# Patient Record
Sex: FEMALE | Race: BLACK OR AFRICAN AMERICAN | NOT HISPANIC OR LATINO | Employment: FULL TIME | URBAN - METROPOLITAN AREA
[De-identification: names, ages, dates, MRNs, and addresses within clinical notes are randomized per-mention and may not be internally consistent; named-entity substitution may affect disease eponyms.]

---

## 2023-03-02 LAB
CLUE CELLS: NORMAL
NUGENT SCORE: 1
YEAST: NORMAL

## 2023-03-15 LAB
ERYTHROCYTE DISTRIBUTION WIDTH (RATIO) BY AUTOMATED COUNT: 12.6 % (ref 11.5–14.5)
ERYTHROCYTE MEAN CORPUSCULAR HEMOGLOBIN CONCENTRATION (G/DL) BY AUTOMATED: 32.8 G/DL (ref 32–36)
ERYTHROCYTE MEAN CORPUSCULAR VOLUME (FL) BY AUTOMATED COUNT: 101 FL (ref 80–100)
ERYTHROCYTES (10*6/UL) IN BLOOD BY AUTOMATED COUNT: 3.01 X10E12/L (ref 4–5.2)
FOLATE, SERUM, PREGNANCY: 5.9 NG/ML
HEMATOCRIT (%) IN BLOOD BY AUTOMATED COUNT: 30.5 % (ref 36–46)
HEMOGLOBIN (G/DL) IN BLOOD: 10 G/DL (ref 12–16)
LEUKOCYTES (10*3/UL) IN BLOOD BY AUTOMATED COUNT: 8.4 X10E9/L (ref 4.4–11.3)
NRBC (PER 100 WBCS) BY AUTOMATED COUNT: 0 /100 WBC (ref 0–0)
PLATELETS (10*3/UL) IN BLOOD AUTOMATED COUNT: 319 X10E9/L (ref 150–450)
REFLEX ADDED, ANEMIA PANEL: ABNORMAL
VITAMIN B12, PREGNANCY: 355 PG/ML

## 2023-03-17 LAB — FERRITIN (UG/LL) IN SER/PLAS: 11 UG/L (ref 8–150)

## 2023-04-07 ENCOUNTER — PATIENT OUTREACH (OUTPATIENT)
Dept: CARE COORDINATION | Facility: CLINIC | Age: 43
End: 2023-04-07
Payer: COMMERCIAL

## 2023-04-07 SDOH — ECONOMIC STABILITY: FOOD INSECURITY
ARE ANY OF YOUR NEEDS URGENT? FOR EXAMPLE, UNCERTAINTY OF WHERE YOU WILL GET YOUR NEXT MEAL OR NOT HAVING THE MEDICATIONS YOU NEED TO TAKE TOMORROW.: NO

## 2023-04-07 SDOH — ECONOMIC STABILITY: GENERAL: WOULD YOU LIKE HELP WITH ANY OF THE FOLLOWING NEEDS?: I DONT NEED HELP WITH ANY OF THESE

## 2023-04-14 ENCOUNTER — DOCUMENTATION (OUTPATIENT)
Dept: CARE COORDINATION | Facility: CLINIC | Age: 43
End: 2023-04-14
Payer: COMMERCIAL

## 2023-04-21 ENCOUNTER — DOCUMENTATION (OUTPATIENT)
Dept: CARE COORDINATION | Facility: CLINIC | Age: 43
End: 2023-04-21
Payer: COMMERCIAL

## 2023-08-25 PROBLEM — O14.10 PREECLAMPSIA, SEVERE (HHS-HCC): Status: ACTIVE | Noted: 2023-08-25

## 2023-08-25 PROBLEM — O26.20: Status: ACTIVE | Noted: 2023-08-25

## 2023-08-25 PROBLEM — O16.9 ELEVATED BLOOD PRESSURE AFFECTING PREGNANCY, ANTEPARTUM (HHS-HCC): Status: ACTIVE | Noted: 2023-08-25

## 2023-08-25 PROBLEM — O09.299: Status: ACTIVE | Noted: 2023-08-25

## 2023-08-25 PROBLEM — M54.9 BACK PAIN: Status: ACTIVE | Noted: 2023-08-25

## 2023-08-25 PROBLEM — L68.0 HIRSUTISM: Status: ACTIVE | Noted: 2023-08-25

## 2023-08-25 PROBLEM — N92.0 MENORRHAGIA: Status: ACTIVE | Noted: 2023-08-25

## 2023-08-25 PROBLEM — O09.529 ADVANCED MATERNAL AGE IN MULTIGRAVIDA (HHS-HCC): Status: ACTIVE | Noted: 2023-08-25

## 2023-08-25 PROBLEM — R60.9 EDEMA: Status: ACTIVE | Noted: 2023-08-25

## 2023-08-25 PROBLEM — D75.89 MACROCYTOSIS: Status: ACTIVE | Noted: 2023-08-25

## 2023-08-25 PROBLEM — O34.219 UTERINE SCAR FROM PREVIOUS CESAREAN DELIVERY AFFECTING PREGNANCY (HHS-HCC): Status: ACTIVE | Noted: 2023-08-25

## 2023-08-25 PROBLEM — O99.013 ANEMIA AFFECTING PREGNANCY IN THIRD TRIMESTER (HHS-HCC): Status: ACTIVE | Noted: 2023-08-25

## 2023-08-25 PROBLEM — O34.219 HISTORY OF CESAREAN DELIVERY, ANTEPARTUM (HHS-HCC): Status: ACTIVE | Noted: 2023-08-25

## 2023-08-25 PROBLEM — R07.9 CHEST PAIN: Status: ACTIVE | Noted: 2023-08-25

## 2023-08-25 PROBLEM — R06.2 WHEEZING: Status: ACTIVE | Noted: 2023-08-25

## 2023-08-25 PROBLEM — O13.9 GESTATIONAL HYPERTENSION (HHS-HCC): Status: ACTIVE | Noted: 2023-08-25

## 2023-08-25 PROBLEM — O36.80X0 PREGNANCY WITH UNCERTAIN FETAL VIABILITY (HHS-HCC): Status: ACTIVE | Noted: 2023-08-25

## 2023-08-25 PROBLEM — L73.2 HYDRADENITIS: Status: ACTIVE | Noted: 2023-08-25

## 2023-08-25 PROBLEM — O45.90 PLACENTAL ABRUPTION (HHS-HCC): Status: ACTIVE | Noted: 2023-08-25

## 2023-08-25 PROBLEM — L02.92 RECURRENT BOILS: Status: ACTIVE | Noted: 2023-08-25

## 2023-08-25 PROBLEM — O03.9 MISCARRIAGE (HHS-HCC): Status: ACTIVE | Noted: 2023-08-25

## 2023-08-25 PROBLEM — E03.9 HYPOTHYROIDISM: Status: ACTIVE | Noted: 2023-08-25

## 2023-08-25 PROBLEM — R74.8 HIGH LEVEL OF CARDIAC MARKER: Status: ACTIVE | Noted: 2023-08-25

## 2023-08-25 PROBLEM — R53.83 FATIGUE: Status: ACTIVE | Noted: 2023-08-25

## 2023-08-25 PROBLEM — O26.00 EXCESS WEIGHT GAIN IN PREGNANCY (HHS-HCC): Status: ACTIVE | Noted: 2023-08-25

## 2023-08-25 PROBLEM — N94.6 DYSMENORRHEA: Status: ACTIVE | Noted: 2023-08-25

## 2023-08-25 PROBLEM — R30.0 DYSURIA: Status: ACTIVE | Noted: 2023-08-25

## 2023-08-25 PROBLEM — R79.89 ELEVATED BRAIN NATRIURETIC PEPTIDE (BNP) LEVEL: Status: ACTIVE | Noted: 2023-08-25

## 2023-08-25 PROBLEM — O99.210 OBESITY IN PREGNANCY (HHS-HCC): Status: ACTIVE | Noted: 2023-08-25

## 2023-08-25 PROBLEM — O28.8 NON-REACTIVE NST (NON-STRESS TEST): Status: ACTIVE | Noted: 2023-08-25

## 2023-08-25 PROBLEM — O35.BXX0 FETAL VENTRICULAR SEPTAL DEFECT AFFECTING ANTEPARTUM CARE OF MOTHER (HHS-HCC): Status: ACTIVE | Noted: 2023-08-25

## 2023-08-25 PROBLEM — R35.0 URINE FREQUENCY: Status: ACTIVE | Noted: 2023-08-25

## 2023-08-25 PROBLEM — J45.909: Status: ACTIVE | Noted: 2023-04-19

## 2023-08-25 PROBLEM — O35.BXX0 ABNORMAL FETAL ECHOCARDIOGRAM AFFECTING ANTEPARTUM CARE OF MOTHER (HHS-HCC): Status: ACTIVE | Noted: 2023-08-25

## 2023-08-25 PROBLEM — L65.9 HAIR LOSS: Status: ACTIVE | Noted: 2023-08-25

## 2023-08-25 PROBLEM — R73.09 ABNORMAL GLUCOSE: Status: ACTIVE | Noted: 2023-08-25

## 2023-08-25 RX ORDER — ALBUTEROL SULFATE 0.83 MG/ML
SOLUTION RESPIRATORY (INHALATION)
COMMUNITY
Start: 2023-02-02

## 2023-08-25 RX ORDER — FERROUS SULFATE 325(65) MG
1 TABLET ORAL 2 TIMES DAILY
COMMUNITY
Start: 2023-02-09 | End: 2023-10-04 | Stop reason: ALTCHOICE

## 2023-08-25 RX ORDER — HYDROCHLOROTHIAZIDE 12.5 MG/1
12.5 TABLET ORAL DAILY
COMMUNITY
Start: 2023-04-13 | End: 2023-10-04 | Stop reason: ALTCHOICE

## 2023-08-25 RX ORDER — AMLODIPINE BESYLATE 10 MG/1
10 TABLET ORAL DAILY
COMMUNITY
Start: 2023-04-13 | End: 2023-10-04 | Stop reason: SDUPTHER

## 2023-08-25 RX ORDER — DEXAMETHASONE 4 MG/1
1 TABLET ORAL 2 TIMES DAILY
COMMUNITY
Start: 2023-01-27 | End: 2023-10-04 | Stop reason: ALTCHOICE

## 2023-08-25 RX ORDER — DROSPIRENONE 4 MG/1
1 TABLET, FILM COATED ORAL DAILY
COMMUNITY
Start: 2022-07-05 | End: 2023-10-04 | Stop reason: ALTCHOICE

## 2023-08-25 RX ORDER — LEVOTHYROXINE SODIUM 50 UG/1
50 CAPSULE ORAL DAILY
COMMUNITY
End: 2023-10-04 | Stop reason: ENTERED-IN-ERROR

## 2023-08-25 RX ORDER — NORETHINDRONE 0.35 MG/1
1 TABLET ORAL DAILY
COMMUNITY
Start: 2019-06-11 | End: 2023-10-04 | Stop reason: ALTCHOICE

## 2023-08-25 RX ORDER — BUDESONIDE AND FORMOTEROL FUMARATE DIHYDRATE 80; 4.5 UG/1; UG/1
2 AEROSOL RESPIRATORY (INHALATION) 2 TIMES DAILY
COMMUNITY
Start: 2023-03-22 | End: 2023-10-04 | Stop reason: ALTCHOICE

## 2023-08-25 RX ORDER — IBUPROFEN 400 MG/1
400 TABLET ORAL EVERY 6 HOURS
COMMUNITY
Start: 2019-06-11 | End: 2023-10-04 | Stop reason: SDUPTHER

## 2023-08-25 RX ORDER — LEVOTHYROXINE SODIUM 175 UG/1
1 TABLET ORAL DAILY
COMMUNITY
End: 2023-10-04 | Stop reason: ENTERED-IN-ERROR

## 2023-08-25 RX ORDER — IBUPROFEN 600 MG/1
600 TABLET ORAL EVERY 6 HOURS
COMMUNITY
Start: 2023-04-06 | End: 2023-10-04 | Stop reason: ALTCHOICE

## 2023-08-25 RX ORDER — POLYETHYLENE GLYCOL 3350 17 G/17G
17 POWDER, FOR SOLUTION ORAL 2 TIMES DAILY PRN
COMMUNITY
Start: 2023-04-06 | End: 2023-10-04 | Stop reason: ALTCHOICE

## 2023-08-25 RX ORDER — METOCLOPRAMIDE 5 MG/1
5 TABLET ORAL 2 TIMES DAILY
COMMUNITY
Start: 2023-04-13 | End: 2023-10-04 | Stop reason: ALTCHOICE

## 2023-08-25 RX ORDER — BREAST PUMP
EACH MISCELLANEOUS
COMMUNITY

## 2023-08-25 RX ORDER — AMOXICILLIN AND CLAVULANATE POTASSIUM 875; 125 MG/1; MG/1
1 TABLET, FILM COATED ORAL
COMMUNITY
Start: 2023-02-02

## 2023-08-25 RX ORDER — ACETAMINOPHEN 325 MG/1
3 TABLET ORAL EVERY 6 HOURS
COMMUNITY
Start: 2023-04-06 | End: 2023-10-04 | Stop reason: ALTCHOICE

## 2023-08-25 RX ORDER — DOCUSATE SODIUM 100 MG/1
100 CAPSULE ORAL AS NEEDED
COMMUNITY
Start: 2019-06-11 | End: 2023-10-04 | Stop reason: ALTCHOICE

## 2023-08-25 RX ORDER — ALBUTEROL SULFATE 90 UG/1
2 AEROSOL, METERED RESPIRATORY (INHALATION) AS NEEDED
COMMUNITY
Start: 2023-01-17

## 2023-08-25 RX ORDER — FUROSEMIDE 20 MG/1
1 TABLET ORAL DAILY
COMMUNITY
Start: 2023-04-10 | End: 2023-10-04 | Stop reason: ALTCHOICE

## 2023-08-27 PROBLEM — R79.89 ELEVATED TROPONIN LEVEL: Status: ACTIVE | Noted: 2023-08-27

## 2023-10-04 ENCOUNTER — LAB (OUTPATIENT)
Dept: LAB | Facility: LAB | Age: 43
End: 2023-10-04
Payer: COMMERCIAL

## 2023-10-04 ENCOUNTER — OFFICE VISIT (OUTPATIENT)
Dept: ENDOCRINOLOGY | Facility: CLINIC | Age: 43
End: 2023-10-04
Payer: COMMERCIAL

## 2023-10-04 VITALS — WEIGHT: 243.4 LBS | HEIGHT: 63 IN | HEART RATE: 76 BPM | BODY MASS INDEX: 43.12 KG/M2

## 2023-10-04 DIAGNOSIS — E03.9 HYPOTHYROIDISM, UNSPECIFIED TYPE: Primary | ICD-10-CM

## 2023-10-04 DIAGNOSIS — E03.9 HYPOTHYROIDISM, UNSPECIFIED TYPE: ICD-10-CM

## 2023-10-04 DIAGNOSIS — Z00.00 ENCOUNTER FOR GENERAL ADULT MEDICAL EXAMINATION WITHOUT ABNORMAL FINDINGS: Primary | ICD-10-CM

## 2023-10-04 DIAGNOSIS — Z00.00 ENCOUNTER FOR GENERAL ADULT MEDICAL EXAMINATION WITHOUT ABNORMAL FINDINGS: ICD-10-CM

## 2023-10-04 LAB
ALBUMIN SERPL BCP-MCNC: 4.2 G/DL (ref 3.4–5)
ALP SERPL-CCNC: 69 U/L (ref 33–110)
ALT SERPL W P-5'-P-CCNC: 12 U/L (ref 7–45)
ANION GAP SERPL CALC-SCNC: 11 MMOL/L (ref 10–20)
AST SERPL W P-5'-P-CCNC: 14 U/L (ref 9–39)
BILIRUB SERPL-MCNC: 0.6 MG/DL (ref 0–1.2)
BUN SERPL-MCNC: 16 MG/DL (ref 6–23)
CALCIUM SERPL-MCNC: 9.3 MG/DL (ref 8.6–10.6)
CHLORIDE SERPL-SCNC: 106 MMOL/L (ref 98–107)
CO2 SERPL-SCNC: 28 MMOL/L (ref 21–32)
CREAT SERPL-MCNC: 0.63 MG/DL (ref 0.5–1.05)
ERYTHROCYTE [DISTWIDTH] IN BLOOD BY AUTOMATED COUNT: 12.8 % (ref 11.5–14.5)
EST. AVERAGE GLUCOSE BLD GHB EST-MCNC: 91 MG/DL
FERRITIN SERPL-MCNC: 26 NG/ML (ref 8–150)
GFR SERPL CREATININE-BSD FRML MDRD: >90 ML/MIN/1.73M*2
GLUCOSE SERPL-MCNC: 80 MG/DL (ref 74–99)
HBA1C MFR BLD: 4.8 %
HCT VFR BLD AUTO: 34.8 % (ref 36–46)
HGB BLD-MCNC: 11 G/DL (ref 12–16)
HGB RETIC QN: 37 PG (ref 28–38)
IMMATURE RETIC FRACTION: 6.9 %
IRON SATN MFR SERPL: 21 % (ref 25–45)
IRON SERPL-MCNC: 73 UG/DL (ref 35–150)
MCH RBC QN AUTO: 32.2 PG (ref 26–34)
MCHC RBC AUTO-ENTMCNC: 31.6 G/DL (ref 32–36)
MCV RBC AUTO: 102 FL (ref 80–100)
NRBC BLD-RTO: 0 /100 WBCS (ref 0–0)
PLATELET # BLD AUTO: 318 X10*3/UL (ref 150–450)
PMV BLD AUTO: 9.7 FL (ref 7.5–11.5)
POTASSIUM SERPL-SCNC: 4 MMOL/L (ref 3.5–5.3)
PROT SERPL-MCNC: 6.7 G/DL (ref 6.4–8.2)
RBC # BLD AUTO: 3.42 X10*6/UL (ref 4–5.2)
RETICS #: 0.05 X10*6/UL (ref 0.02–0.08)
RETICS/RBC NFR AUTO: 1.5 % (ref 0.5–2)
SODIUM SERPL-SCNC: 141 MMOL/L (ref 136–145)
T4 FREE SERPL-MCNC: 1.04 NG/DL (ref 0.78–1.48)
TIBC SERPL-MCNC: 356 UG/DL (ref 240–445)
TSH SERPL-ACNC: 1.27 MIU/L (ref 0.44–3.98)
UIBC SERPL-MCNC: 283 UG/DL (ref 110–370)
WBC # BLD AUTO: 4.5 X10*3/UL (ref 4.4–11.3)

## 2023-10-04 PROCEDURE — 1036F TOBACCO NON-USER: CPT | Performed by: INTERNAL MEDICINE

## 2023-10-04 PROCEDURE — 99213 OFFICE O/P EST LOW 20 MIN: CPT | Performed by: INTERNAL MEDICINE

## 2023-10-04 PROCEDURE — 36415 COLL VENOUS BLD VENIPUNCTURE: CPT

## 2023-10-04 RX ORDER — LEVOTHYROXINE SODIUM 50 UG/1
50 TABLET ORAL DAILY
Qty: 90 TABLET | Refills: 3 | Status: SHIPPED | OUTPATIENT
Start: 2023-10-04 | End: 2024-10-03

## 2023-10-04 ASSESSMENT — ENCOUNTER SYMPTOMS
SHORTNESS OF BREATH: 0
CHILLS: 0
VOMITING: 0
NAUSEA: 0
FEVER: 0
COUGH: 0
DIARRHEA: 0
HEADACHES: 0
FATIGUE: 0
PALPITATIONS: 0

## 2023-10-04 NOTE — PROGRESS NOTES
"Subjective   Patient ID: Marcelina Morin is a 43 y.o. female who presents for Hypothyroidism.  HPI  Since last visit had baby 6 months ago.  Had placental abruption and eclampsia so baby came 5 wks early but now doing great.   Old kids helping. Still breastfeeding but having menses.  Taking t4 as directed.  Notes some hair loss recently    Review of Systems   Constitutional:  Negative for chills, fatigue and fever.   Respiratory:  Negative for cough and shortness of breath.    Cardiovascular:  Negative for chest pain and palpitations.   Gastrointestinal:  Negative for diarrhea, nausea and vomiting.   Neurological:  Negative for headaches.       Objective    10/04/23   Heart Rate 76   Weight 110 kg (243 lb 6.4 oz)   Height 1.6 m (5' 3\")   BMI (Calculated) 43.13     Physical Exam  Constitutional:       Appearance: Normal appearance. She is overweight.   HENT:      Head: Normocephalic and atraumatic.   Neck:      Thyroid: Thyromegaly present. No thyroid mass or thyroid tenderness.      Comments: Mildly enlarged thyroid approx 25-30 gm  Cardiovascular:      Rate and Rhythm: Normal rate and regular rhythm.      Heart sounds: No murmur heard.     No gallop.   Pulmonary:      Effort: Pulmonary effort is normal.      Breath sounds: Normal breath sounds.   Abdominal:      Palpations: Abdomen is soft.      Comments: benign   Neurological:      General: No focal deficit present.      Mental Status: She is alert and oriented to person, place, and time.      Deep Tendon Reflexes: Reflexes are normal and symmetric.   Psychiatric:         Behavior: Behavior is cooperative.         Assessment/Plan   Problem List Items Addressed This Visit             ICD-10-CM    Hypothyroidism - Primary E03.9    Relevant Orders    Thyroxine, Free    Thyroid Stimulating Hormone    Triiodothyronine, Free     Blood work today with adjust based on results, discussed post partum changes.   TL so no further pregnancies planned.  Follow up in one " year

## 2023-10-05 DIAGNOSIS — E55.9 VITAMIN D DEFICIENCY: Primary | ICD-10-CM

## 2023-10-05 LAB
25(OH)D3 SERPL-MCNC: <6 NG/ML (ref 30–100)
FOLATE SERPL-MCNC: 7.3 NG/ML
T3FREE SERPL-MCNC: 2.8 PG/ML (ref 2.3–4.2)
VIT B12 SERPL-MCNC: 418 PG/ML (ref 211–911)

## 2023-10-05 RX ORDER — ERGOCALCIFEROL 1.25 MG/1
1.25 CAPSULE ORAL
Qty: 12 CAPSULE | Refills: 1 | Status: SHIPPED | OUTPATIENT
Start: 2023-10-05 | End: 2024-03-21

## 2023-10-05 NOTE — PROGRESS NOTES
The patient needs vitamin D 50,000 international units once a week for 12 to 24 weeks.  Then she should take over-the-counter vitamin D 2000 international units daily.

## 2023-12-14 ENCOUNTER — HOSPITAL ENCOUNTER (OUTPATIENT)
Dept: RADIOLOGY | Facility: HOSPITAL | Age: 43
Discharge: HOME | End: 2023-12-14
Payer: COMMERCIAL

## 2023-12-14 DIAGNOSIS — Z12.39 ENCOUNTER FOR OTHER SCREENING FOR MALIGNANT NEOPLASM OF BREAST: ICD-10-CM

## 2023-12-14 PROCEDURE — 77063 BREAST TOMOSYNTHESIS BI: CPT

## 2023-12-14 PROCEDURE — 77067 SCR MAMMO BI INCL CAD: CPT | Mod: BILATERAL PROCEDURE | Performed by: STUDENT IN AN ORGANIZED HEALTH CARE EDUCATION/TRAINING PROGRAM

## 2023-12-14 PROCEDURE — 77063 BREAST TOMOSYNTHESIS BI: CPT | Mod: BILATERAL PROCEDURE | Performed by: STUDENT IN AN ORGANIZED HEALTH CARE EDUCATION/TRAINING PROGRAM

## 2023-12-14 PROCEDURE — 77067 SCR MAMMO BI INCL CAD: CPT

## 2024-01-02 ENCOUNTER — TELEPHONE (OUTPATIENT)
Dept: OBSTETRICS AND GYNECOLOGY | Facility: CLINIC | Age: 44
End: 2024-01-02
Payer: COMMERCIAL

## 2024-01-02 DIAGNOSIS — R92.8 ABNORMAL MAMMOGRAM OF RIGHT BREAST: Primary | ICD-10-CM

## 2024-01-02 NOTE — PROGRESS NOTES
Category 2/normal mammogram, however recommendations for a diagnostic right mammogram and cardiovascular risk correlation made due to greater than expected for age arterial calcifications.

## 2024-01-02 NOTE — TELEPHONE ENCOUNTER
"Nurse left message for pt to return call:  Courtney Sanchez MD  [id number] Clinical Support Staff  Category 2 mammogram, however, recommendations for a diagnostic right mammogram (order placed) and \"cardiovascular risk factor correlation made for greater than expected for age arterial calcifications\" (thus PCP F/U needed).  "

## 2024-06-18 ENCOUNTER — APPOINTMENT (OUTPATIENT)
Dept: CARDIOLOGY | Facility: HOSPITAL | Age: 44
End: 2024-06-18
Payer: COMMERCIAL

## 2024-06-18 ENCOUNTER — HOSPITAL ENCOUNTER (EMERGENCY)
Facility: HOSPITAL | Age: 44
Discharge: HOME | End: 2024-06-18
Payer: COMMERCIAL

## 2024-06-18 ENCOUNTER — APPOINTMENT (OUTPATIENT)
Dept: RADIOLOGY | Facility: HOSPITAL | Age: 44
End: 2024-06-18
Payer: COMMERCIAL

## 2024-06-18 VITALS
HEIGHT: 65 IN | WEIGHT: 240 LBS | BODY MASS INDEX: 39.99 KG/M2 | OXYGEN SATURATION: 98 % | RESPIRATION RATE: 18 BRPM | SYSTOLIC BLOOD PRESSURE: 148 MMHG | DIASTOLIC BLOOD PRESSURE: 80 MMHG | HEART RATE: 90 BPM | TEMPERATURE: 97.7 F

## 2024-06-18 DIAGNOSIS — J20.9 ACUTE BRONCHITIS, UNSPECIFIED ORGANISM: ICD-10-CM

## 2024-06-18 DIAGNOSIS — H65.192 ACUTE OTITIS MEDIA WITH EFFUSION OF LEFT EAR: ICD-10-CM

## 2024-06-18 DIAGNOSIS — R07.89 ATYPICAL CHEST PAIN: Primary | ICD-10-CM

## 2024-06-18 LAB
ALBUMIN SERPL BCP-MCNC: 4.2 G/DL (ref 3.4–5)
ALP SERPL-CCNC: 69 U/L (ref 33–110)
ALT SERPL W P-5'-P-CCNC: 11 U/L (ref 7–45)
ANION GAP SERPL CALC-SCNC: 12 MMOL/L (ref 10–20)
AST SERPL W P-5'-P-CCNC: 18 U/L (ref 9–39)
BASOPHILS # BLD AUTO: 0.05 X10*3/UL (ref 0–0.1)
BASOPHILS NFR BLD AUTO: 0.4 %
BILIRUB SERPL-MCNC: 0.6 MG/DL (ref 0–1.2)
BUN SERPL-MCNC: 15 MG/DL (ref 6–23)
CALCIUM SERPL-MCNC: 8.6 MG/DL (ref 8.6–10.3)
CARDIAC TROPONIN I PNL SERPL HS: 4 NG/L (ref 0–13)
CARDIAC TROPONIN I PNL SERPL HS: 4 NG/L (ref 0–13)
CHLORIDE SERPL-SCNC: 105 MMOL/L (ref 98–107)
CO2 SERPL-SCNC: 27 MMOL/L (ref 21–32)
CREAT SERPL-MCNC: 0.71 MG/DL (ref 0.5–1.05)
EGFRCR SERPLBLD CKD-EPI 2021: >90 ML/MIN/1.73M*2
EOSINOPHIL # BLD AUTO: 0.06 X10*3/UL (ref 0–0.7)
EOSINOPHIL NFR BLD AUTO: 0.5 %
ERYTHROCYTE [DISTWIDTH] IN BLOOD BY AUTOMATED COUNT: 13.1 % (ref 11.5–14.5)
GLUCOSE SERPL-MCNC: 135 MG/DL (ref 74–99)
HCT VFR BLD AUTO: 38.3 % (ref 36–46)
HGB BLD-MCNC: 12.6 G/DL (ref 12–16)
IMM GRANULOCYTES # BLD AUTO: 0.06 X10*3/UL (ref 0–0.7)
IMM GRANULOCYTES NFR BLD AUTO: 0.5 % (ref 0–0.9)
LYMPHOCYTES # BLD AUTO: 1.47 X10*3/UL (ref 1.2–4.8)
LYMPHOCYTES NFR BLD AUTO: 12 %
MCH RBC QN AUTO: 33.3 PG (ref 26–34)
MCHC RBC AUTO-ENTMCNC: 32.9 G/DL (ref 32–36)
MCV RBC AUTO: 101 FL (ref 80–100)
MONOCYTES # BLD AUTO: 0.63 X10*3/UL (ref 0.1–1)
MONOCYTES NFR BLD AUTO: 5.1 %
NEUTROPHILS # BLD AUTO: 9.97 X10*3/UL (ref 1.2–7.7)
NEUTROPHILS NFR BLD AUTO: 81.5 %
NRBC BLD-RTO: 0 /100 WBCS (ref 0–0)
PLATELET # BLD AUTO: 299 X10*3/UL (ref 150–450)
POTASSIUM SERPL-SCNC: 3.5 MMOL/L (ref 3.5–5.3)
PROT SERPL-MCNC: 7.2 G/DL (ref 6.4–8.2)
RBC # BLD AUTO: 3.78 X10*6/UL (ref 4–5.2)
SODIUM SERPL-SCNC: 140 MMOL/L (ref 136–145)
WBC # BLD AUTO: 12.2 X10*3/UL (ref 4.4–11.3)

## 2024-06-18 PROCEDURE — 96375 TX/PRO/DX INJ NEW DRUG ADDON: CPT

## 2024-06-18 PROCEDURE — 2500000004 HC RX 250 GENERAL PHARMACY W/ HCPCS (ALT 636 FOR OP/ED): Performed by: PHYSICIAN ASSISTANT

## 2024-06-18 PROCEDURE — 96374 THER/PROPH/DIAG INJ IV PUSH: CPT

## 2024-06-18 PROCEDURE — 36415 COLL VENOUS BLD VENIPUNCTURE: CPT | Performed by: EMERGENCY MEDICINE

## 2024-06-18 PROCEDURE — 93005 ELECTROCARDIOGRAM TRACING: CPT

## 2024-06-18 PROCEDURE — 84484 ASSAY OF TROPONIN QUANT: CPT | Performed by: EMERGENCY MEDICINE

## 2024-06-18 PROCEDURE — 99284 EMERGENCY DEPT VISIT MOD MDM: CPT | Mod: 25

## 2024-06-18 PROCEDURE — 71046 X-RAY EXAM CHEST 2 VIEWS: CPT

## 2024-06-18 PROCEDURE — 2500000002 HC RX 250 W HCPCS SELF ADMINISTERED DRUGS (ALT 637 FOR MEDICARE OP, ALT 636 FOR OP/ED): Performed by: PHYSICIAN ASSISTANT

## 2024-06-18 PROCEDURE — 71046 X-RAY EXAM CHEST 2 VIEWS: CPT | Performed by: RADIOLOGY

## 2024-06-18 PROCEDURE — 94640 AIRWAY INHALATION TREATMENT: CPT

## 2024-06-18 PROCEDURE — 85025 COMPLETE CBC W/AUTO DIFF WBC: CPT | Performed by: EMERGENCY MEDICINE

## 2024-06-18 PROCEDURE — 84075 ASSAY ALKALINE PHOSPHATASE: CPT | Performed by: EMERGENCY MEDICINE

## 2024-06-18 RX ORDER — ALBUTEROL SULFATE 90 UG/1
2 AEROSOL, METERED RESPIRATORY (INHALATION) EVERY 4 HOURS PRN
Qty: 18 G | Refills: 0 | Status: SHIPPED | OUTPATIENT
Start: 2024-06-18 | End: 2024-07-18

## 2024-06-18 RX ORDER — IPRATROPIUM BROMIDE AND ALBUTEROL SULFATE 2.5; .5 MG/3ML; MG/3ML
3 SOLUTION RESPIRATORY (INHALATION) ONCE
Status: COMPLETED | OUTPATIENT
Start: 2024-06-18 | End: 2024-06-18

## 2024-06-18 RX ORDER — DOXYCYCLINE 100 MG/1
100 CAPSULE ORAL 2 TIMES DAILY
Qty: 14 CAPSULE | Refills: 0 | Status: SHIPPED | OUTPATIENT
Start: 2024-06-18 | End: 2024-06-25

## 2024-06-18 RX ORDER — PREDNISONE 10 MG/1
TABLET ORAL
Qty: 18 TABLET | Refills: 0 | Status: SHIPPED | OUTPATIENT
Start: 2024-06-18

## 2024-06-18 RX ORDER — KETOROLAC TROMETHAMINE 30 MG/ML
15 INJECTION, SOLUTION INTRAMUSCULAR; INTRAVENOUS ONCE
Status: COMPLETED | OUTPATIENT
Start: 2024-06-18 | End: 2024-06-18

## 2024-06-18 ASSESSMENT — COLUMBIA-SUICIDE SEVERITY RATING SCALE - C-SSRS
6. HAVE YOU EVER DONE ANYTHING, STARTED TO DO ANYTHING, OR PREPARED TO DO ANYTHING TO END YOUR LIFE?: NO
1. IN THE PAST MONTH, HAVE YOU WISHED YOU WERE DEAD OR WISHED YOU COULD GO TO SLEEP AND NOT WAKE UP?: NO
2. HAVE YOU ACTUALLY HAD ANY THOUGHTS OF KILLING YOURSELF?: NO

## 2024-06-18 ASSESSMENT — HEART SCORE
RISK FACTORS: 1-2 RISK FACTORS
HISTORY: SLIGHTLY SUSPICIOUS
AGE: <45
HEART SCORE: 2
TROPONIN: LESS THAN OR EQUAL TO NORMAL LIMIT
ECG: NON-SPECIFIC REPOLARIZATION DISTURBANCE

## 2024-06-18 ASSESSMENT — PAIN SCALES - GENERAL: PAINLEVEL_OUTOF10: 5 - MODERATE PAIN

## 2024-06-18 ASSESSMENT — PAIN DESCRIPTION - ORIENTATION: ORIENTATION: MID

## 2024-06-18 ASSESSMENT — PAIN DESCRIPTION - LOCATION: LOCATION: CHEST

## 2024-06-18 ASSESSMENT — PAIN - FUNCTIONAL ASSESSMENT: PAIN_FUNCTIONAL_ASSESSMENT: 0-10

## 2024-06-18 NOTE — ED TRIAGE NOTES
PT SENT FROM DR. TAO AT  D/T CHEST PRESSURE, PT WENT FOR COUGH/CONGESTION/SOB, 1 DUO NEB GIVEN SOB SUBSIDED BUT PT STILL FELT CHEST PRESSURE, EKG NSR AT , EKG OBTAINED IN TRIAGE, AMBULATED TO TRIAGE GAIT STEADY, +SMOKER, -BIRTH CONTROL, -BLOOD THINNERS, DENIES RECENT LONG TRAVELS, DENIES CARDIAC HX, DENIES PALPITATIONS/N/V/D/F/CHILLS

## 2024-06-18 NOTE — Clinical Note
Marcelina Morin was seen and treated in our emergency department on 6/18/2024.  She may return to work on 06/19/2024.       If you have any questions or concerns, please don't hesitate to call.      Ab Cook PA-C

## 2024-06-18 NOTE — ED PROVIDER NOTES
HPI   Chief Complaint   Patient presents with    Chest Pain       History of present illness: 43-year-old female complains of chest pain for the last week.  Chest pain is worse with coughing.  Is mostly in the anterior chest but also the back when coughing.  Cough is occasionally productive with white-yellow sputum.  She was seen in urgent care previously and given DuoNeb treatment.  This offered some improvement of her symptoms.  They would like her to get a chest x-ray and she was directed to go to the emergency department.  Has some fevers chills rhinorrhea congestion.  Denies abdominal pain vomiting diarrhea constipation dysuria polyuria.  Denies any recent travel, surgeries, hospitalizations, hormone therapy, hemoptysis, history of malignancy, history of DVT or PE.    Review of systems: Constitutional, eye, ENT, cardiovascular, respiratory, gastrointestinal, genitourinary, neurologic, musculoskeletal, dermatologic, hematologic, endocrine systems were evaluated and were negative unless otherwise specified in history of present illness.    Medications: Reviewed and per nursing note.    Family history: Denies relevant medical conditions.    Past medical history: Asthma    Social history: Denies tobacco, alcohol, drug use.      Physical exam:    Appearance: Well-developed, well-nourished, nontoxic-appearing, alert and oriented x3. Talking in complete sentences.    HEENT: Inflamed nasal turbinates.  Erythema induration left tympanic membrane.  Head normocephalic atraumatic, extraocular movements intact, pupils equal round reactive to light, mucous membranes are moist and pink.    NECK:  Nml Inspection, no meningismus, no thyromegaly, no lymphadenopathy, no JVD, trachea is midline.    Respiratory: Expiratory rhonchi heard best in the right anterior with no crackles.  No respiratory distress.    Cardiovascular: Regular rate and rhythm, no murmurs, rubs or gallops. Pulses 2+ symmetrically in the dorsalis pedis and  radial pulses.    Abdomen/GI:  Soft, nontender, nondistended, normal bowel sounds x4. No masses or organomegaly.    :  No CVA tenderness    Neuro:  Oriented x 3, Speech Clear, cranial nerves grossly intact. Normal sensation to light touch in all 4 extremities.    Musculoskeletal: Patient spontaneously moves all 4 extremities.    Skin:  No cyanosis, clubbing, edema, open wounds, or rashes.                            No data recorded                   Patient History   Past Medical History:   Diagnosis Date    Obesity complicating pregnancy, unspecified trimester ([facility]) 2022    Obesity in pregnancy    Obesity complicating pregnancy, unspecified trimester ([facility]) 2022    Obesity in pregnancy    Obesity complicating pregnancy, unspecified trimester ([facility]) 2022    Obesity in pregnancy    Obesity complicating pregnancy, unspecified trimester ([facility]) 2019    Obesity in pregnancy    Other specified health status 2019    No pertinent past medical history    Personal history of other diseases of the musculoskeletal system and connective tissue 2015    History of backache    Supervision of elderly multigravida, second trimester ([facility]) 2022    Multigravida of advanced maternal age in second trimester    Supervision of high risk pregnancy, unspecified, second trimester ([facility]) 2022    High-risk pregnancy in second trimester    Supervision of high risk pregnancy, unspecified, second trimester ([facility]) 2019    High-risk pregnancy in second trimester     Past Surgical History:   Procedure Laterality Date     SECTION, CLASSIC  2022     Section    MOUTH SURGERY  2018    Oral Surgery Tooth Extraction     Family History   Problem Relation Name Age of Onset    Hypertension Mother          gamaliel    Hypertension Father          gamaliel    Other (Tetralogy of fallot) Daughter       Social History     Tobacco Use    Smoking status: Former      Current packs/day: 0.00     Types: Cigarettes     Quit date: 2022     Years since quittin.9    Smokeless tobacco: Never   Substance Use Topics    Alcohol use: Not on file    Drug use: Not on file       Physical Exam   ED Triage Vitals [24 1359]   Temperature Heart Rate Respirations BP   36.5 °C (97.7 °F) 90 20 137/70      Pulse Ox Temp src Heart Rate Source Patient Position   98 % -- -- --      BP Location FiO2 (%)     -- --       Physical Exam    ED Course & MDM   Diagnoses as of 24 1607   Atypical chest pain   Acute bronchitis, unspecified organism   Acute otitis media with effusion of left ear       Medical Decision Making  EKG: Normal sinus rhythm at rate of 87 bpm with no ST segment elevation or ectopy.  Nonspecific ST abnormality.  No clear acute ischemia.  KY interval 168 with a QTc 425.  This interpreted by myself.    Labs Reviewed  CBC WITH AUTO DIFFERENTIAL - Abnormal     WBC                           12.2 (*)               nRBC                          0.0                    RBC                           3.78 (*)               Hemoglobin                    12.6                   Hematocrit                    38.3                   MCV                           101 (*)                MCH                           33.3                   MCHC                          32.9                   RDW                           13.1                   Platelets                     299                    Neutrophils %                 81.5                   Immature Granulocytes %, Automated   0.5                    Lymphocytes %                 12.0                   Monocytes %                   5.1                    Eosinophils %                 0.5                    Basophils %                   0.4                    Neutrophils Absolute          9.97 (*)               Immature Granulocytes Absolute, Au*   0.06                   Lymphocytes Absolute          1.47                   Monocytes  Absolute            0.63                   Eosinophils Absolute          0.06                   Basophils Absolute            0.05                COMPREHENSIVE METABOLIC PANEL - Abnormal     Glucose                       135 (*)                Sodium                        140                    Potassium                     3.5                    Chloride                      105                    Bicarbonate                   27                     Anion Gap                     12                     Urea Nitrogen                 15                     Creatinine                    0.71                   eGFR                          >90                    Calcium                       8.6                    Albumin                       4.2                    Alkaline Phosphatase          69                     Total Protein                 7.2                    AST                           18                     Bilirubin, Total              0.6                    ALT                           11                  SERIAL TROPONIN-INITIAL - Normal     Troponin I, High Sensitivity   4                          Narrative: Less than 99th percentile of normal range cutoff-                  Female and children under 18 years old <14 ng/L; Male <21 ng/L: Negative                  Repeat testing should be performed if clinically indicated.                                     Female and children under 18 years old 14-50 ng/L; Male 21-50 ng/L:                  Consistent with possible cardiac damage and possible increased clinical                   risk. Serial measurements may help to assess extent of myocardial damage.                                     >50 ng/L: Consistent with cardiac damage, increased clinical risk and                  myocardial infarction. Serial measurements may help assess extent of                   myocardial damage.                                      NOTE: Children less than 1 year old may  have higher baseline troponin                   levels and results should be interpreted in conjunction with the overall                   clinical context.                                     NOTE: Troponin I testing is performed using a different                   testing methodology at [facility] than at other                   system [facility]. Direct result comparisons should only                   be made within the same method.  SERIAL TROPONIN, 1 HOUR - Normal     Troponin I, High Sensitivity   4                          Narrative: Less than 99th percentile of normal range cutoff-                  Female and children under 18 years old <14 ng/L; Male <21 ng/L: Negative                  Repeat testing should be performed if clinically indicated.                                     Female and children under 18 years old 14-50 ng/L; Male 21-50 ng/L:                  Consistent with possible cardiac damage and possible increased clinical                   risk. Serial measurements may help to assess extent of myocardial damage.                                     >50 ng/L: Consistent with cardiac damage, increased clinical risk and                  myocardial infarction. Serial measurements may help assess extent of                   myocardial damage.                                      NOTE: Children less than 1 year old may have higher baseline troponin                   levels and results should be interpreted in conjunction with the overall                   clinical context.                                     NOTE: Troponin I testing is performed using a different                   testing methodology at [facility] than at other                   [facility]. Direct result comparisons should only                   be made within the same method.  TROPONIN SERIES- (INITIAL, 1 HR)    XR chest 2 views   Final Result    Negative exam.          MACRO:    None          Signed  by: Akash Silvestre 6/18/2024 2:50 PM    Dictation workstation:   [id number]         43-year-old female complains of shortness of breath and cough with some chest discomfort.  Differential diagnosis of bronchitis, pulmonary embolism, aortic dissection, coronary syndrome, pneumothorax.  Examination shows expiratory rhonchi and patient with productive cough.  Some reproducible tenderness.  Negative PERC criteria making PE unlikely.    EKG troponin CBC CMP chest x-ray ordered.  Also ordered DuoNeb breathing treatments, Solu-Medrol, Toradol IV.    EKG shows no acute ischemia.  Troponin normal x 2.  Chemistry shows glucose 135 with normal electrolytes renal function.  CBC shows white blood cell count 12.2 with normal hemoglobin hematocrit.  Chest x-ray shows no infiltrates or acute findings.    Patient had improvement of symptoms.  Low risk heart score, unlikely coronary syndrome.  Presentation most consistent with acute bronchitis with possible underlying otitis media.  Patient is given prescription for albuterol inhaler, prednisone taper, doxycycline.  Will need to follow-up primary provider.    Patient will be discharged to home with prescription.  Patient is educated in signs and symptoms of worsening symptoms and reasons to come back to the emergency department.  Will need to follow up with primary care provider.  Patient does not report social determinants of health impacting ability to obtain care that is needed.  Patient agrees with plan.    This is a transcription.  Text was reviewed for errors, but some transcription errors may remain.  Please call for any questions.          Procedure  Procedures     Ab Cook PA-C  06/18/24 1600       Ab Cook PA-C  06/18/24 1619

## 2024-06-21 LAB
ATRIAL RATE: 87 BPM
P AXIS: 57 DEGREES
P OFFSET: 182 MS
P ONSET: 128 MS
PR INTERVAL: 168 MS
Q ONSET: 212 MS
QRS COUNT: 15 BEATS
QRS DURATION: 82 MS
QT INTERVAL: 354 MS
QTC CALCULATION(BAZETT): 425 MS
QTC FREDERICIA: 400 MS
R AXIS: 28 DEGREES
T AXIS: 38 DEGREES
T OFFSET: 389 MS
VENTRICULAR RATE: 87 BPM

## 2024-11-26 DIAGNOSIS — E03.9 HYPOTHYROIDISM, UNSPECIFIED TYPE: ICD-10-CM

## 2024-11-26 RX ORDER — LEVOTHYROXINE SODIUM 50 UG/1
50 TABLET ORAL DAILY
Qty: 90 TABLET | Refills: 0 | Status: SHIPPED | OUTPATIENT
Start: 2024-11-26 | End: 2025-11-26

## 2025-02-18 ENCOUNTER — APPOINTMENT (OUTPATIENT)
Dept: ENDOCRINOLOGY | Facility: CLINIC | Age: 45
End: 2025-02-18
Payer: COMMERCIAL

## 2025-02-26 DIAGNOSIS — E03.9 HYPOTHYROIDISM, UNSPECIFIED TYPE: ICD-10-CM

## 2025-02-26 RX ORDER — LEVOTHYROXINE SODIUM 50 UG/1
50 TABLET ORAL DAILY
Qty: 90 TABLET | Refills: 0 | Status: SHIPPED | OUTPATIENT
Start: 2025-02-26

## 2025-04-04 ENCOUNTER — ALLIED HEALTH (OUTPATIENT)
Dept: INTEGRATIVE MEDICINE | Facility: CLINIC | Age: 45
End: 2025-04-04

## 2025-04-04 PROCEDURE — CYTAX SALES TAX CUYAHOGA COUNT

## 2025-04-04 PROCEDURE — MASSG60 MASSAGE 60 MINUTES

## 2025-04-04 NOTE — PROGRESS NOTES
Massage Therapy Visit:     Marcelina Morin was self-referred.    Condition of Client Subjective :  Patient ID: Marcelina Morin is a 44 y.o. female who presents for reason for visit of Back Pain (Pain in upper back; says tension is mostly surrounding scapulas and she feels like the weight of her chest is what's contributing to that tension/rounded shoulders.).  HPI    Session Information  Visit Type: New patient  Description of present complaint: Muscle tension, Stress    Before providing massage therapy, I discussed the provision of massage therapy services and any pertinent issues related to the patient's status with the patient's nurse. I implemented fall prevention measures including handrails, nonskid socks, and having a call light within reach. Following massage therapy, I provided a report to the patient's nurse.    Review of Systems    Objective   Pre-treatment Assessment  Arrival Mode: Ambulatory  Treatment Precautions: None    Physical Exam    Actions Assessment/Plan :  Provider reviewed plan for the massage session, precautions and contraindications. Patient/guardian/hospital staff has given consent to treat with full understanding of what to expect during the session. Before massage therapy began, provider explained to the patient to communicate at any time if the pressure was causing discomfort past their tolerance level. Patient agreed to advise therapist.    Massage Treatment  Patient Position: Bed, Prone, Supine  Positioning Assistance: Did not need assistance, Pillow(s)/bolster under knees while supine, Pillow(s)/bolster under anles while prone  Massage Technique: Nurturing touch, Myofascial release, Positional release, Stretching, Relaxation massage, Therapeutic massage  Area/Body Region: Rotator cuff b/l, occipitals, SCM b/l, scalenes b/l, erectors b/l, rhomboids b/l, and trapezius b/l  Pressure Scale: 3 - Medium pressure, 2 - Mild pressure, 4 - Moderate pressure  Action Note: Stretching, deep  glide, myofascial glide, traction, circular friction, scapular rotation.    Response:  Post-treatment Assessment  Patient Fell Asleep During Treatment: No  Patient Noted Improvement of the Following Symptoms: Muscle tension, ROM, Trigger/tender point  Response Note: Was very relaxed and smiling after our session    Evaluation:   Massage Therapy Evaluation / Recommendation(s) / Follow-up  Post-Treatment Recommendation: 24-48 hours of intense hydration post massage; minimal/no quick movements; add strength training to regimen, targeting the upper back muscles, to manage/keep away back pain as much as possible. She mentioned that at a point in time she did yoga consistently and said it helped a lot, so I reiterated that incorporating that back in to her routine in conjunction with massage would help a lot.  Recommendations: Yoga  Patient Comments: Said this was one of the best massages she ever had! She is excited to get back into a self care routine that will assist with dealing with life stressors and help her to feel her best overall.  Follow-up: Massage once a month/PRN

## 2025-05-09 ENCOUNTER — APPOINTMENT (OUTPATIENT)
Dept: INTEGRATIVE MEDICINE | Facility: CLINIC | Age: 45
End: 2025-05-09
Payer: COMMERCIAL

## 2025-05-09 PROCEDURE — CYTAX SALES TAX CUYAHOGA COUNT

## 2025-05-09 PROCEDURE — MASSG60 MASSAGE 60 MINUTES

## 2025-05-09 NOTE — PROGRESS NOTES
Massage Therapy Visit:     Marcelina Morin was self-referred.    Condition of Client Subjective :  Patient ID: Marcelina Morin is a 44 y.o. female who presents for reason for visit of Back Pain (Upper back pain).  HPI    Session Information  Visit Type: Follow-up visit  Description of present complaint: Muscle tension  Since Last Visit, Patient Noted Improved: Muscle tension    Before providing massage therapy, I discussed the provision of massage therapy services and any pertinent issues related to the patient's status with the patient's nurse. I implemented fall prevention measures including handrails, nonskid socks, and having a call light within reach. Following massage therapy, I provided a report to the patient's nurse.    Review of Systems    Objective   Pre-treatment Assessment  Arrival Mode: Ambulatory  Treatment Precautions: None    Physical Exam    Actions Assessment/Plan :  Provider reviewed plan for the massage session, precautions and contraindications. Patient/guardian/hospital staff has given consent to treat with full understanding of what to expect during the session. Before massage therapy began, provider explained to the patient to communicate at any time if the pressure was causing discomfort past their tolerance level. Patient agreed to advise therapist.    Massage Treatment  Patient Position: Bed, Prone, Supine  Positioning Assistance: Did not need assistance, Pillow(s)/bolster under knees while supine, Pillow(s)/bolster under anles while prone  Massage Technique: Therapeutic massage, Relaxation massage, Superficial fascial release, Myofascial release  Area/Body Region: Upper body requested; traps b/l, scm b/l, scalenes b/l, occipitals, erectors b/l, rhomboids b/l, rotator cuff b/l  Pressure Scale: 3 - Medium pressure, 2 - Mild pressure  Action Note: Traction, direct stretch, joint movement, tpt/cff/mdf, myofascial glide/mfr, deep glide/touch    Response:  Post-treatment  Assessment  Patient Fell Asleep During Treatment: No  Patient Noted Improvement of the Following Symptoms: Muscle tension, Trigger/tender point    Evaluation:   Massage Therapy Evaluation / Recommendation(s) / Follow-up  Post-Treatment Recommendation: Hydrate!!!  Recommendations: Yoga  Follow-up: Massage once a month/PRN

## 2025-06-03 DIAGNOSIS — E03.9 HYPOTHYROIDISM, UNSPECIFIED TYPE: ICD-10-CM

## 2025-06-03 RX ORDER — LEVOTHYROXINE SODIUM 50 UG/1
50 TABLET ORAL DAILY
Qty: 90 TABLET | Refills: 0 | Status: SHIPPED | OUTPATIENT
Start: 2025-06-03

## 2025-06-06 ENCOUNTER — APPOINTMENT (OUTPATIENT)
Dept: INTEGRATIVE MEDICINE | Facility: CLINIC | Age: 45
End: 2025-06-06

## 2025-06-13 ENCOUNTER — APPOINTMENT (OUTPATIENT)
Dept: INTEGRATIVE MEDICINE | Facility: CLINIC | Age: 45
End: 2025-06-13

## 2025-06-13 PROCEDURE — CYTAX SALES TAX CUYAHOGA COUNT

## 2025-06-13 PROCEDURE — MASSG60 MASSAGE 60 MINUTES

## 2025-06-16 NOTE — PROGRESS NOTES
Massage Therapy Visit:     Marcelina Morin was self-referredDictation #1  MRN:[id number]  [facility]:[id number] .    Condition of Client Subjective :  Patient ID: Marcelina Morin is a 44 y.o. female who presents for reason for visit of Muscle Pain (Chronic upper body tension in neck + shoulders (R side feels tighter than L)).  HPI    Session Information  Visit Type: Follow-up visit  Description of present complaint: Chronic pain, Muscle tension  Since Last Visit, Patient Noted Worsening: Muscle tension    Before providing massage therapy, I discussed the provision of massage therapy services and any pertinent issues related to the patient's status with the patient's nurse. I implemented fall prevention measures including handrails, nonskid socks, and having a call light within reach. Following massage therapy, I provided a report to the patient's nurse.    Review of Systems    Objective   Pre-treatment Assessment  Arrival Mode: Ambulatory  Treatment Precautions: None    Physical Exam    Actions Assessment/Plan :  Provider reviewed plan for the massage session, precautions and contraindications. Patient/guardian/hospital staff has given consent to treat with full understanding of what to expect during the session. Before massage therapy began, provider explained to the patient to communicate at any time if the pressure was causing discomfort past their tolerance level. Patient agreed to advise therapist.    Massage Treatment  Patient Position: Bed, Prone, Supine  Positioning Assistance: Did not need assistance, Pillow(s)/bolster under knees while supine, Pillow(s)/bolster under anles while prone  Massage Technique: Relaxation massage, Therapeutic massage, Nurturing touch, Positional release, Stretching, Myofascial release  Area/Body Region: Occipitals b/l, scm b/l, traps b/l, scalenes b/l, rhomboids b/l, levator scapulae b/l, rotator cuff b/l, deltoid b/l  Pressure Scale: 3 - Medium pressure  Action Note: Traction, deep  glide/touch, joint capsule release, mfr/myofascial glide, joint movement, pin & stretch, tpt/mdf/cff, direct stretch, circular friction, kneading    Response:  Post-treatment Assessment  Patient Fell Asleep During Treatment: No  Patient Noted Improvement of the Following Symptoms: Muscle tension, Trigger/tender point    Evaluation:   Massage Therapy Evaluation / Recommendation(s) / Follow-up  Post-Treatment Recommendation: Hydrate  Follow-up: Massage PRN

## 2025-06-19 ENCOUNTER — APPOINTMENT (OUTPATIENT)
Dept: ENDOCRINOLOGY | Facility: CLINIC | Age: 45
End: 2025-06-19
Payer: COMMERCIAL

## 2025-06-19 VITALS
RESPIRATION RATE: 16 BRPM | WEIGHT: 247 LBS | HEIGHT: 65 IN | SYSTOLIC BLOOD PRESSURE: 124 MMHG | BODY MASS INDEX: 41.15 KG/M2 | DIASTOLIC BLOOD PRESSURE: 82 MMHG | HEART RATE: 86 BPM

## 2025-06-19 DIAGNOSIS — E03.9 HYPOTHYROIDISM, UNSPECIFIED TYPE: Primary | ICD-10-CM

## 2025-06-19 DIAGNOSIS — N92.0 MENORRHAGIA WITH REGULAR CYCLE: ICD-10-CM

## 2025-06-19 DIAGNOSIS — R53.83 OTHER FATIGUE: ICD-10-CM

## 2025-06-19 PROCEDURE — 3079F DIAST BP 80-89 MM HG: CPT | Performed by: INTERNAL MEDICINE

## 2025-06-19 PROCEDURE — 99213 OFFICE O/P EST LOW 20 MIN: CPT | Performed by: INTERNAL MEDICINE

## 2025-06-19 PROCEDURE — 3074F SYST BP LT 130 MM HG: CPT | Performed by: INTERNAL MEDICINE

## 2025-06-19 PROCEDURE — 3008F BODY MASS INDEX DOCD: CPT | Performed by: INTERNAL MEDICINE

## 2025-06-19 PROCEDURE — 1036F TOBACCO NON-USER: CPT | Performed by: INTERNAL MEDICINE

## 2025-06-19 ASSESSMENT — ENCOUNTER SYMPTOMS
COUGH: 0
DIARRHEA: 0
CHILLS: 0
NAUSEA: 0
VOMITING: 0
SHORTNESS OF BREATH: 0
HEADACHES: 0
PALPITATIONS: 0
FEVER: 0
FATIGUE: 0

## 2025-06-19 NOTE — PROGRESS NOTES
Endocrinology: Follow up visit  Subjective   Patient ID: Marcelina Morin is a 44 y.o. female who presents for Hypothyroidism.    PCP: Ana Gutiérrez MD    HPI  Last seen 10/2023 for hypothyroidism  Taking t4 as directed  C/o fatigue and difficulty losing weight, also with brain fog.    Menses regular but heavy      Review of Systems   Constitutional:  Negative for chills, fatigue and fever.   Respiratory:  Negative for cough and shortness of breath.    Cardiovascular:  Negative for chest pain and palpitations.   Gastrointestinal:  Negative for diarrhea, nausea and vomiting.   Neurological:  Negative for headaches.       Problem List[1]     Home Meds:  Current Outpatient Medications   Medication Instructions    albuterol 2.5 mg /3 mL (0.083 %) nebulizer solution Albuterol Sulfate (2.5 MG/3ML) 0.083% Inhalation Nebulization Solution  Quantity: 75   Refills: 0  Start: 2-Feb-2023    albuterol 90 mcg/actuation inhaler 2 puffs, inhalation, Every 4 hours PRN    breast pump device Please dispense one double electric breast pump.   Use as directed   DX: normal breast feeding/lactating mother    levothyroxine (SYNTHROID, LEVOXYL) 50 mcg, oral, Daily    PNV no.95/ferrous fum/folic ac (PRENATAL ORAL) 1 tablet, Daily    predniSONE (Deltasone) 10 mg tablet Take three tablets orally day 1-3, two tablets day 4-6, and one tablet day 7-9        RX Allergies[2]     Objective   Vitals:    06/19/25 1114   BP: 124/82   Pulse: 86   Resp: 16      Vitals:    06/19/25 1114   Weight: 112 kg (247 lb)      Body mass index is 41.15 kg/m².   Physical Exam  Constitutional:       Appearance: Normal appearance. She is overweight.   HENT:      Head: Normocephalic and atraumatic.   Neck:      Thyroid: No thyroid mass, thyromegaly or thyroid tenderness.   Cardiovascular:      Rate and Rhythm: Normal rate and regular rhythm.      Heart sounds: No murmur heard.     No gallop.   Pulmonary:      Effort: Pulmonary effort is normal.      Breath sounds:  "Normal breath sounds.   Abdominal:      Palpations: Abdomen is soft.      Comments: benign   Neurological:      General: No focal deficit present.      Mental Status: She is alert and oriented to person, place, and time.      Deep Tendon Reflexes: Reflexes are normal and symmetric.   Psychiatric:         Behavior: Behavior is cooperative.         Labs:  Lab Results   Component Value Date    HGBA1C 4.8 10/04/2023    TSH 1.27 10/04/2023    FREET4 1.04 10/04/2023      No results found for: \"PR1\", \"THYROIDPAB\", \"TSI\"     Assessment/Plan   Assessment & Plan  Hypothyroidism, unspecified type  Recheck tsh in near future  Orders:    TSH with reflex to Free T4 if abnormal; Future    Other fatigue  Check cbc and cmp  Due for pcp physical, if labs ok may need sleep eval  Orders:    Comprehensive Metabolic Panel; Future    CBC; Future    Menorrhagia with regular cycle    Orders:    CBC; Future        Electronically signed by:  Ngozi Baron MD 25 11:36 AM                   [1]   Patient Active Problem List  Diagnosis    Abnormal fetal echocardiogram affecting antepartum care of mother ([facility])    Abnormal glucose    Anemia affecting pregnancy in third trimester    Non-reactive NST (non-stress test)    Asthma in mother affecting childbirth ([facility])    Upper back pain on right side    Chest pain    Placental abruption ([facility])    Dysmenorrhea    Dysuria    Edema    Elevated blood pressure affecting pregnancy, antepartum    Elevated brain natriuretic peptide (BNP) level    Excess weight gain in pregnancy ([facility])    Recurrent pregnancy loss, antepartum ([facility])    Fatigue    Fetal ventricular septal defect affecting antepartum care of mother ([facility])    High level of cardiac marker    Hirsutism    History of  delivery, antepartum ([facility])    Hypothyroidism    Macrocytosis    Menorrhagia    Miscarriage ([facility])    Preeclampsia, severe ([facility])    Pregnancy with uncertain fetal viability ([facility])    Prior " pregnancy with congenital cardiac defect, antepartum    Recurrent boils    Separation of symphysis pubis during delivery ([facility])    Gestational hypertension ([facility])    Urine frequency    Uterine scar from previous  delivery affecting pregnancy ([facility])    Wheezing    Advanced maternal age in multigravida ([facility])    Hair loss    Elevated troponin level   [2] No Known Allergies

## 2025-06-19 NOTE — ASSESSMENT & PLAN NOTE
Check cbc and cmp  Due for pcp physical, if labs ok may need sleep eval  Orders:    Comprehensive Metabolic Panel; Future    CBC; Future

## 2025-07-25 ENCOUNTER — ALLIED HEALTH (OUTPATIENT)
Dept: INTEGRATIVE MEDICINE | Facility: CLINIC | Age: 45
End: 2025-07-25

## 2025-07-25 PROCEDURE — MASSG60 MASSAGE 60 MINUTES

## 2025-07-25 PROCEDURE — CYTAX SALES TAX CUYAHOGA COUNT

## 2025-07-28 NOTE — PROGRESS NOTES
Massage Therapy Visit:     Marcelina Morin was self-referred.    Condition of Client Subjective :  Patient ID: Marcelina Morin is a 45 y.o. female who presents for reason for visit of Neck Pain (Chronic neck pain from repetitive actions for her job; sits at a desk a lot which is more than likely causing her discomfort. Recent plane travel is also more than likely contributing to physical stress).  HPI    Session Information  Visit Type: Follow-up visit  Description of present complaint: Chronic pain, Muscle tension    Before providing massage therapy, I discussed the provision of massage therapy services and any pertinent issues related to the patient's status with the patient's nurse. I implemented fall prevention measures including handrails, nonskid socks, and having a call light within reach. Following massage therapy, I provided a report to the patient's nurse.    Review of Systems    Objective   Pre-treatment Assessment  Arrival Mode: Ambulatory  Treatment Precautions: None    Physical Exam    Actions Assessment/Plan :  Provider reviewed plan for the massage session, precautions and contraindications. Patient/guardian/hospital staff has given consent to treat with full understanding of what to expect during the session. Before massage therapy began, provider explained to the patient to communicate at any time if the pressure was causing discomfort past their tolerance level. Patient agreed to advise therapist.    Massage Treatment  Patient Position: Bed, Prone, Supine  Positioning Assistance: Did not need assistance, Pillow(s)/bolster under knees while supine, Pillow(s)/bolster under anles while prone  Massage Technique: Stretching, Therapeutic massage, Superficial fascial release, Nurturing touch, Positional release, Relaxation massage, Soft tissue mobilization, Fascial release, Myofascial release  Area/Body Region: Shoulder joints b/l, scm b/l, occipitals, traps b/l, scalenes b/l, deltoid b/l, pec  major/minor b/l, rhomboids b/l, erectors b/l, lat dorsi bl, teres major b/l  Pressure Scale: 3 - Medium pressure  Action Note: Thermotherapy, deep glide/touch, joint capsule release, prom stretch, joint movement, pin & stretch, positional release, tpt/mdf/cff, direct stretch, circular friction, mfr/myofascial glide    Response:  Post-treatment Assessment  Patient Fell Asleep During Treatment: No  Patient Noted Improvement of the Following Symptoms: Muscle tension, Trigger/tender point, ROM    Evaluation:   Massage Therapy Evaluation / Recommendation(s) / Follow-up  Post-Treatment Recommendation: Hydrate  Follow-up: Massage PRN

## 2025-08-22 ENCOUNTER — APPOINTMENT (OUTPATIENT)
Dept: INTEGRATIVE MEDICINE | Facility: CLINIC | Age: 45
End: 2025-08-22
Payer: COMMERCIAL

## 2025-08-22 PROCEDURE — CYTAX SALES TAX CUYAHOGA COUNT

## 2025-08-22 PROCEDURE — MASSG60 MASSAGE 60 MINUTES

## 2025-09-19 ENCOUNTER — APPOINTMENT (OUTPATIENT)
Dept: INTEGRATIVE MEDICINE | Facility: CLINIC | Age: 45
End: 2025-09-19
Payer: COMMERCIAL